# Patient Record
Sex: FEMALE | Race: WHITE
[De-identification: names, ages, dates, MRNs, and addresses within clinical notes are randomized per-mention and may not be internally consistent; named-entity substitution may affect disease eponyms.]

---

## 2022-01-21 ENCOUNTER — HOSPITAL ENCOUNTER (EMERGENCY)
Dept: HOSPITAL 96 - M.ERS | Age: 47
Discharge: LEFT BEFORE DISCHARGE | End: 2022-01-21
Payer: MEDICARE

## 2022-01-21 VITALS — SYSTOLIC BLOOD PRESSURE: 144 MMHG | DIASTOLIC BLOOD PRESSURE: 81 MMHG

## 2022-01-21 VITALS — WEIGHT: 169.01 LBS | HEIGHT: 66 IN | BODY MASS INDEX: 27.16 KG/M2

## 2022-01-21 DIAGNOSIS — R06.02: Primary | ICD-10-CM

## 2022-01-21 DIAGNOSIS — Z53.21: ICD-10-CM

## 2022-01-22 NOTE — EKG
[place] [address]
Phone:  [phone]                     ELECTROCARDIOGRAM REPORT      
_______________________________________________________________________________
 
Name:         SHELLY REGALADO            Room:                     [facility]#:    [id number]     Account #:     [id number]  
Admission:    22    Attend Phys:                     
Discharge:    22    Date of Birth: [birth year]  
Date of Service: 222  Report #:      [id number]
        [id number]
_______________________________________________________________________________
THIS REPORT FOR:  //name//                      
 
                         [facility] ED
                                       
Test Date:    2022               Test Time:    12:12:39
Pat Name:     SHELLY REGALADO           Department:   
Patient ID:   [id number]            Room:          
Gender:                               Technician:   
:          1975               Requested By: Colton Lopez
Order Number: [id number] MD:   Christ Tom
                                 Measurements
Intervals                              Axis          
Rate:         84                       P:            85
WI:           156                      QRS:          53
QRSD:         110                      T:            31
QT:           360                                    
QTc:          426                                    
                           Interpretive Statements
Sinus rhythm
Low voltage, precordial leads
RSR' in V1 or V2, right VCD or RVH
No previous ECG available for comparison
Electronically Signed On 2022 10:06:51 CST by Christ Tom
[email or web address]
 
 
 
 
 
 
 
 
 
 
 
 
 
 
 
 
 
 
 
 
  <ELECTRONICALLY SIGNED>
                                           By: Christ Tom MD, [facility]      
  22     1006
D: 22 121   _____________________________________
T: 22 1212   Christ Tom MD, [facility]        /EPI